# Patient Record
Sex: FEMALE | Race: WHITE | Employment: PART TIME | URBAN - METROPOLITAN AREA
[De-identification: names, ages, dates, MRNs, and addresses within clinical notes are randomized per-mention and may not be internally consistent; named-entity substitution may affect disease eponyms.]

---
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
## 2022-02-18 ENCOUNTER — OFFICE VISIT (OUTPATIENT)
Dept: HEMATOLOGY | Age: 22
End: 2022-02-18
Payer: COMMERCIAL

## 2022-02-18 VITALS
DIASTOLIC BLOOD PRESSURE: 78 MMHG | OXYGEN SATURATION: 100 % | TEMPERATURE: 97.3 F | BODY MASS INDEX: 19.36 KG/M2 | HEIGHT: 60 IN | WEIGHT: 98.6 LBS | HEART RATE: 76 BPM | SYSTOLIC BLOOD PRESSURE: 105 MMHG | RESPIRATION RATE: 16 BRPM

## 2022-02-18 DIAGNOSIS — B18.2 CHRONIC HEPATITIS C WITHOUT HEPATIC COMA (HCC): Primary | ICD-10-CM

## 2022-02-18 PROBLEM — K29.30 CHRONIC SUPERFICIAL GASTRITIS WITHOUT BLEEDING: Status: RESOLVED | Noted: 2020-03-18 | Resolved: 2022-02-18

## 2022-02-18 PROBLEM — K59.04 CHRONIC IDIOPATHIC CONSTIPATION: Status: RESOLVED | Noted: 2020-02-14 | Resolved: 2022-02-18

## 2022-02-18 PROBLEM — R11.0 CHRONIC NAUSEA: Status: RESOLVED | Noted: 2020-02-14 | Resolved: 2022-02-18

## 2022-02-18 PROBLEM — N92.6 IRREGULAR MENSES: Chronic | Status: RESOLVED | Noted: 2020-09-08 | Resolved: 2022-02-18

## 2022-02-18 PROBLEM — R11.10 CHRONIC VOMITING: Status: RESOLVED | Noted: 2020-02-14 | Resolved: 2022-02-18

## 2022-02-18 PROBLEM — F43.0 ACUTE STRESS REACTION: Status: RESOLVED | Noted: 2020-09-10 | Resolved: 2022-02-18

## 2022-02-18 PROBLEM — R63.4 UNEXPLAINED WEIGHT LOSS: Status: RESOLVED | Noted: 2020-02-14 | Resolved: 2022-02-18

## 2022-02-18 PROCEDURE — 99203 OFFICE O/P NEW LOW 30 MIN: CPT | Performed by: INTERNAL MEDICINE

## 2022-02-18 RX ORDER — BUPRENORPHINE AND NALOXONE 8; 2 MG/1; MG/1
FILM, SOLUBLE BUCCAL; SUBLINGUAL
COMMUNITY
Start: 2022-01-18

## 2022-02-18 RX ORDER — NALOXONE HYDROCHLORIDE 4 MG/.1ML
SPRAY NASAL AS NEEDED
COMMUNITY
Start: 2021-11-17

## 2022-02-18 NOTE — PROGRESS NOTES
Identified pt with two pt identifiers(name and ). Reviewed record in preparation for visit and have obtained necessary documentation. Chief Complaint   Patient presents with    New Patient     Establish care    Hepatitis C      Vitals:    22 0802   BP: 105/78   Pulse: 76   Resp: 16   Temp: 97.3 °F (36.3 °C)   TempSrc: Temporal   SpO2: 100%   Weight: 98 lb 9.6 oz (44.7 kg)   Height: 5' (1.524 m)   PainSc:   7   PainLoc: Pelvic   LMP: 2022       Health Maintenance Review: Patient reminded of \"due or due soon\" health maintenance. I have asked the patient to contact his/her primary care provider (PCP) for follow-up on his/her health maintenance. Coordination of Care Questionnaire:  :   1) Have you been to an emergency room, urgent care, or hospitalized since your last visit? If yes, where when, and reason for visit? no       2. Have seen or consulted any other health care provider since your last visit? If yes, where when, and reason for visit? NO      Patient is accompanied by self I have received verbal consent from DANETTE/Jason Linda to discuss any/all medical information while they are present in the room.

## 2022-02-18 NOTE — PROGRESS NOTES
Omid Xiao MD, [place], [place], [place]      Shady Ramos, PAGABBY Walker, [facility]     Ramonita MUNOZ Claude, [facility]   [facility] Alex [facility]    Amanda De La Garza, [facility]       [facility] 136    at [address], [address] [place], [facility] 22. [phone]    FAX: [address] [place] [address], [address]    [phone]    FAX: [phone]       Patient Care Team:  Grace Lozano MD as PCP - General (Pediatric Medicine)  Tawana Sultana MD (Pediatric Gastroenterology)      Problem List  Date Reviewed: 2/18/2022          Codes Class Noted    Chronic hepatitis C ([facility] 75.) ICD-10-CM: B18.2  ICD-9-CM: 070.54  2/18/2022        Major depressive disorder, recurrent episode, moderate ([facility] ICD-10-CM: F33.1  ICD-9-CM: 296.32  9/8/2020        Tobacco use (Chronic) ICD-10-CM: Z72.0  ICD-9-CM: 305.1  9/8/2020        Gastroesophageal reflux disease without esophagitis ICD-10-CM: K21.9  ICD-9-CM: 530.81  2/14/2020              ADDENDUM:  The patient left the office without getting laboratory studies. She has been contacted by phone to get these studies which will confirm that she has HCV and are necessary to obtain medications for treatment of HCV. These can be obtained when she returns for follow-up appointment     Retta Hatchet, MD  [facility] [address] [facility] 22.  [phone]  [address]        The clinicians listed above have asked me to see Edwar Denise in consultation regarding chronic HCV and its management. All medical records sent by the referring physicians were reviewed     The patient is a 25 y.o.   female who was screened for anti-HCV and tested positive after enrolling in drug treatment in 7/2021. Risk factors for acquiring HCV are IV drug use in 2021 - 1/2022. There was no history of acute icteric hepatitis at the time of these risk factors. Imaging of the liver was not performed. An assessment of liver fibrosis with biopsy or elastography has not been performed. The patient has never received treatment for chronic HCV. The patient does not have any symptoms which could be attributed to the liver disorder. The patient is not experiencing the following symptoms which are commonly seen in this liver disorder:   fatigue,   pain in the right side over the liver,     The patient completes all daily activities without any functional limitations. ASSESSMENT AND PLAN:  Chronic HCV   Chronic HCV is of unclear severity. Liver transaminases are normal.  Liver function is normal.  The platelet count is normal.    Since liver enzymes are normal she may have had spontaneous remission of HCV. Will perform laboratory testing to monitor liver function and degree of liver injury. This included BMP, hepatic panel, CBC with platelet count,     Will perform and/or review results of HCV viral load, HCV genotype to define the specific treatment and duration of treatment that will be required. Will perform serologic and virologic studies to assess for other causes of chronic liver disease. Will perform imaging of the liver with ultrasound. The need to perform an assessment of liver fibrosis was discussed with the patient. The Fibroscan can assess liver fibrosis and determine if a patient has advanced fibrosis or cirrhosis without the need for liver biopsy. This will be performed at the next office visit. Chronic HCV Treatment  The patient has not been treated for HCV. The patient has HCV genotype that is not yet defined. Discussed the treatment alternatives.   The SVR/cure rate for HCV now exceeds 97% without significant side effects for most patients with HCV. The specific treatment is dependent upon genotype, viral load and histology. Screening for Hepatocellular Carcinoma  HCC screening is not necessary if the patient has no evidence of cirrhosis. Treatment of other medical problems in patients with chronic liver disease  There are no contraindications for the patient to take most medications that are necessary for treatment of other medical issues. Counseling for alcohol in patients with chronic liver disease  The patient was counseled regarding alcohol consumption and the effect of alcohol on chronic liver disease. The patient does not consume any significant amount of alcohol. Substance Use  The patient was counseled regarding the risk of overdose and death from using opioids and other narcotic drugs. Discussed the risk of becoming reinfected with HCV once they are cured if they resume IV drug use or inhaling drugs nasally. The patient has not used drugs since 1/2022. We would like the patient to demonstrate 6 months of abstinence from drug use and to have completed a drug abuse program prior to initiating HCV treatment. There is no contraindication to treating HCV in patients who are actively using drugs and the SVR/cure rate is the same as persons who no not use drugs. Vaccinations   The need for vaccination against viral hepatitis A and B will be assessed with serologic and instituted as appropriate. Routine vaccinations against other bacterial and viral agents can be performed as indicated. Annual flu vaccination should be administered if indicated. ALLERGIES  No Known Allergies    MEDICATIONS  Current Outpatient Medications   Medication Sig    buprenorphine-naloxone (SUBOXONE) 8-2 mg film sublingaul film PLACE 1.5 FILMS EVERY DAY BY SUBLINGUAL ROUTE AS DIRECTED FOR 30 DAYS.  Narcan 4 mg/actuation nasal spray as needed.     traZODone (DESYREL) 50 mg tablet Take 1 Tab by mouth nightly as needed for Sleep. (Patient taking differently: Take 100 mg by mouth nightly as needed for Sleep.)    SPRINTEC, 28, 0.25-35 mg-mcg tab     sucralfate (CARAFATE) 1 gram tablet Take 1 Tab by mouth four (4) times daily as needed (GI Pain). (Patient not taking: Reported on 10/14/2021)     No current facility-administered medications for this visit. SYSTEM REVIEW NOT RELATED TO LIVER DISEASE OR REVIEWED ABOVE:  Constitution systems: Negative for fever, chills, weight gain, weight loss. Eyes: Negative for visual changes. ENT: Negative for sore throat, painful swallowing. Respiratory: Negative for cough, hemoptysis, SOB. Cardiology: Negative for chest pain, palpitations. GI:  Negative for constipation or diarrhea. : Negative for urinary frequency, dysuria, hematuria, nocturia. Skin: Negative for rash. Hematology: Negative for easy bruising, blood clots. Musculo-skelatal: Negative for back pain, muscle pain, weakness. Neurologic: Negative for headaches, dizziness, vertigo, memory problems not related to HE. Psychology: Negative for anxiety, depression. FAMILY HISTORY:  There is no family history of liver disease. SOCIAL HISTORY:  The patient has never been . The patient has no children. The patient vapes. The patient has never consumed significant amounts of alcohol. The patient currently works full time as a  for [employer]. PHYSICAL EXAMINATION:  Visit Vitals  /78 (BP Patient Position: Sitting, BP Cuff Size: Adult)   Pulse 76   Temp 97.3 °F (36.3 °C) (Temporal)   Resp 16   Ht 5' (1.524 m)   Wt 98 lb 9.6 oz (44.7 kg)   LMP 02/06/2022 (Approximate)   SpO2 100%   BMI 19.26 kg/m²     General: No acute distress. Eyes: Sclera anicteric. ENT: No oral lesions. Thyroid normal.  Nodes: No adenopathy. Skin: No spider angiomata. No jaundice. No palmar erythema. Respiratory: Lungs clear to auscultation. Cardiovascular: Regular heart rate. No murmurs. No JVD. Abdomen: Soft non-tender. Liver size normal to percussion/palpation. Spleen not palpable. No obvious ascites. Extremities: No edema. No muscle wasting. No gross arthritic changes. Neurologic: Alert and oriented. Cranial nerves grossly intact. No asterixis. LABORATORY STUDIES:  Liver [place] of [address] [place] [address] Units 9/7/2020   WBC 3.6 - 11.0 K/uL 14.6 (H)   ANC 1.8 - 8.0 K/UL 11.9 (H)   HGB 11.5 - 16.0 g/dL 12.4    - 400 K/uL 332   AST 15 - 37 U/L 21   ALT 12 - 78 U/L 25   Alk Phos 40 - 120 U/L 63   Bili, Total 0.2 - 1.0 MG/DL 0.4   Albumin 3.5 - 5.0 g/dL 4.0   BUN 6 - 20 MG/DL 9   Creat 0.55 - 1.02 MG/DL 0.90   Na 136 - 145 mmol/L 139   K 3.5 - 5.1 mmol/L 4.1   Cl 97 - 108 mmol/L 102   CO2 21 - 32 mmol/L 25   Glucose 65 - 100 mg/dL 92     SEROLOGIES:  Not available or performed. Testing was performed today. LIVER HISTOLOGY:  Not available or performed    ENDOSCOPIC PROCEDURES:  Not available or performed    RADIOLOGY:  Not available or performed    OTHER TESTING:  Not available or performed    FOLLOW-UP:  All of the issues listed above in the Assessment and Plan were discussed with the patient. All questions were answered. The patient expressed a clear understanding of the above. [address] [address] in 4 weeks for Fibroscan and to initiate HCV treatment.       MD [facility]iffany [facility] 13 of [address] [facility] 22.  [phone]  [address] [facility]

## 2022-02-18 NOTE — Clinical Note
3/26/2022    Patient: Fede Crane   YOB: 2000   Date of Visit: 2/18/2022     Yaritza Ni MD  [address] [facility] [address]  Via Fax: [phone]     Deniz Lynch MD  [address] [place] [address]    Dear MD Deniz Ulrich MD,      Thank you for referring Ms. Clau Victor to [address] [place] [address] [facility] Fournier for evaluation. My notes for this consultation are attached. If you have questions, please do not hesitate to call me. I look forward to following your patient along with you.       Sincerely,    Mrayam Lemus MD

## 2022-03-15 NOTE — PROGRESS NOTES
10/14/2021    Luz Marina Grey  2000    CC: Abnormal liver tests    History of present illness  Lissette Terry was seen today as a new patient for concern of liver disease based on abnormal laboratory testing. There was no preceding illness or trauma. There has been no chronic fevers, weight loss, or infectious exposures. The patient was born in the [place]. She has an uncle who  of hepatitis C. There have been no blood transfusions, tattoos. She does admit to sharing needles using IV drugs with her boyfriend in the recent past but has not done that or used any heroin in the last 4 weeks. She still does smoke marijuana     No new drugs or OTC therapies have been started or tried recently. There is no reported headache, nausea, sclera icterus, school decline, dark urine, light stools, or pruritus. There is no emesis or melena. No Known Allergies    Current Outpatient Medications   Medication Sig Dispense Refill    traZODone (DESYREL) 50 mg tablet Take 1 Tab by mouth nightly as needed for Sleep. (Patient taking differently: Take 100 mg by mouth nightly as needed for Sleep.) 30 Tab 1    sucralfate (CARAFATE) 1 gram tablet Take 1 Tab by mouth four (4) times daily as needed (GI Pain). (Patient not taking: Reported on 10/14/2021) 120 Tab 1    SPRINTEC, 28, 0.25-35 mg-mcg tab  (Patient not taking: Reported on 10/14/2021)         Social History    Lives with Biologic Parent No     Adopted No     Foster child No     Multiple Birth No     Smoke exposure Yes        Family History   Problem Relation Age of Onset    No Known Problems Mother     No Known Problems Father      Family Hx of liver disease specifically includes: Uncle with hepatitis C    No past surgical history on file. No prior abdominal surgeries    Vaccines are up to date by report, including Hepatitis A and B series.     Review of Systems  General: denies weight loss, fever  Hematologic: denies bruising, excessive Quality 226: Preventive Care And Screening: Tobacco Use: Screening And Cessation Intervention: Tobacco Screening not Performed for Unknown Reasons bleeding   Head/Neck: denies vision changes, sore throat, runny nose, nose bleeds, or hearing changes  Respiratory: denies shortness of breath, wheezing, stridor, or cough  Cardiovascular: denies chest pain, hypertension, palpitations, syncope, dyspnea on exertion  Gastrointestinal: No jaundice or abdominal distention or abdominal pain  Genitourinary: denies dysuria, frequency, urgency, or enuresis or daytime wetting  Musculoskeletal: denies pain, swelling, redness of muscles or joints  Neurologic: denies convulsions, paralyses, or tremor  Dermatologic: denies rash, itching, or dryness  Psychiatric/Behavior: denies emotional problems, anxiety, depression, or previous psychiatric care  Lymphatic: denies local or general lymph node enlargement or tenderness  Endocrine: denies polydipsia, polyuria, intolerance to heat or cold, or abnormal sexual development. Allergic: denies reactions to drug      Physical Exam  Vitals:    10/14/21 1014   BP: 117/71   Pulse: 74   Resp: 18   Temp: 98.5 °F (36.9 °C)   TempSrc: Oral   SpO2: 98%   Weight: 110 lb (49.9 kg)   Height: 5' 3.66\" (1.617 m)   PainSc:   0 - No pain   LMP: 08/26/2021     General: She is awake, alert, and in no distress, and appears to be well nourished and well hydrated. HEENT: The sclera appear anicteric, the conjunctiva pink, the oral mucosa appears without lesions, and the dentition is fair. Chest: Clear breath sounds without wheezing bilaterally. CV: Regular rate and rhythm without murmur  Abdomen: soft, non-tender, non-distended, without masses. There is no hepatosplenomegaly.   Bowel sounds active  Extremities: well perfused with no joint abnormalities  Skin: no rash, no jaundice  Neuro: moves all 4 well, normal gait  Lymph: no significant lymphadenopathy    Labs reviewed and demonstrate the following abnormalities: Cannabinoid positive drug screen negative for opiates ALT of 71 normal bilirubin and alk phos hepatitis A antibody positive hepatitis B- Quality 337: Tuberculosis Prevention For Psoriasis And Psoriatic Arthritis Patients On A Biological Immune Response Modifier: No documentation of negative or managed positive TB screen RPR nonreactive    Impression       Impression  Ross Coughlin is 25 y.o. with liver disease - as a new problem. She previously been followed by Dr. Martha Llanes for reflux. She is not having any reflux now but has an elevated ALT in the 70s. She does admit to needle sharing with boyfriend which is concerning for hepatitis C. Plan/Recommendation  Labs: Repeat hepatic panel with GGT, UYEN, anti-SM, anti-LKM, ceruplasmin, iron, Hep C antibody  Imaging: Liver U/S ordered  Nutrition: The importance of maintaining a healthy weight and healthy lifestyle was discussed. For routine treatment of pain or fever, acetaminophen used at standard weight based dosages is preferred over NSAID'S. Follow-up post labs and imaging  Counseled to stop needle sharing for IV drug use         All patient and caregiver questions and concerns were addressed during the visit. Major risks, benefits, and side-effects of therapy were discussed. Quality 47: Advance Care Plan: Advance care planning not documented, reason not otherwise specified. Quality 111:Pneumonia Vaccination Status For Older Adults: Pneumococcal Vaccination Previously Received Quality 137: Melanoma: Continuity Of Care - Recall System: Patient information entered into a recall system that includes: target date for the next exam specified AND a process to follow up with patients regarding missed or unscheduled appointments Quality 130: Documentation Of Current Medications In The Medical Record: Current Medications Documented Quality 402: Tobacco Use And Help With Quitting Among Adolescents: Patient screened for tobacco and is a smoker AND received Cessation Counseling Detail Level: Detailed Quality 431: Preventive Care And Screening: Unhealthy Alcohol Use - Screening: Patient not screened for unhealthy alcohol use using a systematic screening method Quality 410: Psoriasis Clinical Response To Oral Systemic Or Biologic Mediations: Psoriasis Assessment Tool NOT Documented Quality 265: Biopsy Follow-Up: Biopsy results reviewed, communicated, tracked, and documented Quality 138: Melanoma: Coordination Of Care: A treatment plan was communicated to the physicians providing continuing care within one month of diagnosis outlining: diagnosis, tumor thickness and a plan for surgery or alternate care.

## 2022-03-18 PROBLEM — B18.2 CHRONIC HEPATITIS C (HCC): Status: ACTIVE | Noted: 2022-02-18

## 2022-03-18 PROBLEM — F33.1 MAJOR DEPRESSIVE DISORDER, RECURRENT EPISODE, MODERATE (HCC): Status: ACTIVE | Noted: 2020-09-08

## 2022-03-19 PROBLEM — Z72.0 TOBACCO USE: Status: ACTIVE | Noted: 2020-09-08

## 2022-03-20 PROBLEM — K21.9 GASTROESOPHAGEAL REFLUX DISEASE WITHOUT ESOPHAGITIS: Status: ACTIVE | Noted: 2020-02-14

## 2022-03-23 ENCOUNTER — DOCUMENTATION ONLY (OUTPATIENT)
Dept: HEMATOLOGY | Age: 22
End: 2022-03-23

## 2022-03-23 ENCOUNTER — TELEPHONE (OUTPATIENT)
Dept: HEMATOLOGY | Age: 22
End: 2022-03-23

## 2022-03-23 NOTE — PROGRESS NOTES
Relayed message from [address] [employer]. She states she lost the paper and will need to get them at [facility].  I mailed labs to patient.

## 2022-03-23 NOTE — TELEPHONE ENCOUNTER
----- Message from Simón Bueno MD sent at 3/22/2022  4:28 AM EDT -----  Regarding: She did not get any labs when left office. Have her get labs. Let me know when back  We cannot tell her if she still has HCV or if it resolved without labs  MLS        [email or web address] Attempt to call patient no answer mailbox full. Called mother June Becton) who is on patient HIPPA form. Mother states \" good luck because patient is bad about returning calls, she just don't care. \" Ask mother to pass on the message please that Dr. Radha Ruiz needs labs to check HCV. \" Mother states \"I will try. \" (KF)---[phone] Patient return call. Patient instructed to have labs drawn soon as possible. (MARCOS)

## 2022-03-23 NOTE — TELEPHONE ENCOUNTER
----- Message from Margo Fuller MD sent at 3/22/2022  4:28 AM EDT -----  Regarding: She did not get any labs when left office. Have her get labs.   Let me know when back  We cannot tell her if she still has HCV or if it resolved without labs  MLS

## 2023-09-11 ENCOUNTER — HOSPITAL ENCOUNTER (EMERGENCY)
Facility: HOSPITAL | Age: 23
Discharge: HOME | End: 2023-09-11
Attending: STUDENT IN AN ORGANIZED HEALTH CARE EDUCATION/TRAINING PROGRAM
Payer: COMMERCIAL

## 2023-09-11 VITALS
OXYGEN SATURATION: 99 % | HEART RATE: 92 BPM | DIASTOLIC BLOOD PRESSURE: 105 MMHG | TEMPERATURE: 97.4 F | SYSTOLIC BLOOD PRESSURE: 151 MMHG | RESPIRATION RATE: 16 BRPM

## 2023-09-11 DIAGNOSIS — R11.2 NAUSEA AND VOMITING, UNSPECIFIED VOMITING TYPE: Primary | ICD-10-CM

## 2023-09-11 LAB
ALBUMIN SERPL-MCNC: 4.1 G/DL (ref 3.5–5)
ALBUMIN/GLOB SERPL: 0.7 (ref 1.1–2.2)
ALP SERPL-CCNC: 101 U/L (ref 45–117)
ALT SERPL-CCNC: 101 U/L (ref 12–78)
ANION GAP SERPL CALC-SCNC: 7 MMOL/L (ref 5–15)
AST SERPL-CCNC: 97 U/L (ref 15–37)
BASOPHILS # BLD: 0.1 K/UL (ref 0–0.1)
BASOPHILS NFR BLD: 1 % (ref 0–1)
BILIRUB SERPL-MCNC: 0.9 MG/DL (ref 0.2–1)
BUN SERPL-MCNC: 14 MG/DL (ref 6–20)
BUN/CREAT SERPL: 14 (ref 12–20)
CALCIUM SERPL-MCNC: 10 MG/DL (ref 8.5–10.1)
CHLORIDE SERPL-SCNC: 99 MMOL/L (ref 97–108)
CO2 SERPL-SCNC: 25 MMOL/L (ref 21–32)
CREAT SERPL-MCNC: 0.98 MG/DL (ref 0.55–1.02)
DIFFERENTIAL METHOD BLD: ABNORMAL
EOSINOPHIL # BLD: 0 K/UL (ref 0–0.4)
EOSINOPHIL NFR BLD: 0 % (ref 0–7)
ERYTHROCYTE [DISTWIDTH] IN BLOOD BY AUTOMATED COUNT: 12 % (ref 11.5–14.5)
GLOBULIN SER CALC-MCNC: 5.5 G/DL (ref 2–4)
GLUCOSE SERPL-MCNC: 121 MG/DL (ref 65–100)
HCT VFR BLD AUTO: 42.6 % (ref 35–47)
HGB BLD-MCNC: 14.6 G/DL (ref 11.5–16)
IMM GRANULOCYTES # BLD AUTO: 0 K/UL (ref 0–0.04)
IMM GRANULOCYTES NFR BLD AUTO: 0 % (ref 0–0.5)
LIPASE SERPL-CCNC: 39 U/L (ref 73–393)
LYMPHOCYTES # BLD: 1.5 K/UL (ref 0.8–3.5)
LYMPHOCYTES NFR BLD: 19 % (ref 12–49)
MCH RBC QN AUTO: 29.9 PG (ref 26–34)
MCHC RBC AUTO-ENTMCNC: 34.3 G/DL (ref 30–36.5)
MCV RBC AUTO: 87.3 FL (ref 80–99)
MONOCYTES # BLD: 0.3 K/UL (ref 0–1)
MONOCYTES NFR BLD: 4 % (ref 5–13)
NEUTS SEG # BLD: 6.1 K/UL (ref 1.8–8)
NEUTS SEG NFR BLD: 76 % (ref 32–75)
NRBC # BLD: 0 K/UL (ref 0–0.01)
NRBC BLD-RTO: 0 PER 100 WBC
PLATELET # BLD AUTO: 284 K/UL (ref 150–400)
PMV BLD AUTO: 10.3 FL (ref 8.9–12.9)
POTASSIUM SERPL-SCNC: 4.8 MMOL/L (ref 3.5–5.1)
PROT SERPL-MCNC: 9.6 G/DL (ref 6.4–8.2)
RBC # BLD AUTO: 4.88 M/UL (ref 3.8–5.2)
SODIUM SERPL-SCNC: 131 MMOL/L (ref 136–145)
WBC # BLD AUTO: 8 K/UL (ref 3.6–11)

## 2023-09-11 PROCEDURE — 96374 THER/PROPH/DIAG INJ IV PUSH: CPT

## 2023-09-11 PROCEDURE — 99284 EMERGENCY DEPT VISIT MOD MDM: CPT

## 2023-09-11 PROCEDURE — 36415 COLL VENOUS BLD VENIPUNCTURE: CPT

## 2023-09-11 PROCEDURE — 85025 COMPLETE CBC W/AUTO DIFF WBC: CPT

## 2023-09-11 PROCEDURE — 6360000002 HC RX W HCPCS

## 2023-09-11 PROCEDURE — 2580000003 HC RX 258

## 2023-09-11 PROCEDURE — 83690 ASSAY OF LIPASE: CPT

## 2023-09-11 PROCEDURE — 96361 HYDRATE IV INFUSION ADD-ON: CPT

## 2023-09-11 PROCEDURE — 80053 COMPREHEN METABOLIC PANEL: CPT

## 2023-09-11 RX ORDER — 0.9 % SODIUM CHLORIDE 0.9 %
1000 INTRAVENOUS SOLUTION INTRAVENOUS ONCE
Status: COMPLETED | OUTPATIENT
Start: 2023-09-11 | End: 2023-09-11

## 2023-09-11 RX ORDER — ONDANSETRON 2 MG/ML
4 INJECTION INTRAMUSCULAR; INTRAVENOUS ONCE
Status: COMPLETED | OUTPATIENT
Start: 2023-09-11 | End: 2023-09-11

## 2023-09-11 RX ORDER — ONDANSETRON 4 MG/1
4 TABLET, ORALLY DISINTEGRATING ORAL 3 TIMES DAILY PRN
Qty: 9 TABLET | Refills: 0 | Status: SHIPPED | OUTPATIENT
Start: 2023-09-11 | End: 2023-09-14

## 2023-09-11 RX ADMIN — ONDANSETRON 4 MG: 2 INJECTION INTRAMUSCULAR; INTRAVENOUS at 14:40

## 2023-09-11 RX ADMIN — SODIUM CHLORIDE 1000 ML: 9 INJECTION, SOLUTION INTRAVENOUS at 14:39

## 2023-09-11 ASSESSMENT — ENCOUNTER SYMPTOMS
COUGH: 0
CHEST TIGHTNESS: 0
ABDOMINAL PAIN: 0
NAUSEA: 1
RHINORRHEA: 0
SORE THROAT: 0
VOMITING: 1
BACK PAIN: 0
CONSTIPATION: 0
DIARRHEA: 0
SHORTNESS OF BREATH: 0
WHEEZING: 0

## 2025-07-31 ENCOUNTER — TRANSCRIBE ORDERS (OUTPATIENT)
Facility: HOSPITAL | Age: 25
End: 2025-07-31

## 2025-07-31 DIAGNOSIS — R19.5 ELEVATED FECAL CALPROTECTIN: Primary | ICD-10-CM

## 2025-07-31 DIAGNOSIS — R10.13 ABDOMINAL PAIN, EPIGASTRIC: ICD-10-CM

## 2025-07-31 DIAGNOSIS — B19.20 HEPATITIS C VIRUS INFECTION WITHOUT HEPATIC COMA, UNSPECIFIED CHRONICITY: ICD-10-CM

## 2025-07-31 DIAGNOSIS — R11.2 NAUSEA AND VOMITING, UNSPECIFIED VOMITING TYPE: ICD-10-CM

## (undated) DEVICE — TUBING HYDR IRR --

## (undated) DEVICE — FORCEPS BX L240CM JAW DIA2.8MM L CAP W/ NDL MIC MESH TOOTH